# Patient Record
Sex: FEMALE | Race: BLACK OR AFRICAN AMERICAN | NOT HISPANIC OR LATINO | URBAN - METROPOLITAN AREA
[De-identification: names, ages, dates, MRNs, and addresses within clinical notes are randomized per-mention and may not be internally consistent; named-entity substitution may affect disease eponyms.]

---

## 2023-01-19 ENCOUNTER — OFFICE (OUTPATIENT)
Dept: URBAN - METROPOLITAN AREA CLINIC 13 | Facility: CLINIC | Age: 33
End: 2023-01-19
Payer: COMMERCIAL

## 2023-01-19 VITALS
HEIGHT: 63 IN | SYSTOLIC BLOOD PRESSURE: 118 MMHG | OXYGEN SATURATION: 98 % | HEART RATE: 65 BPM | DIASTOLIC BLOOD PRESSURE: 78 MMHG | WEIGHT: 170 LBS

## 2023-01-19 DIAGNOSIS — R14.0 ABDOMINAL DISTENSION (GASEOUS): ICD-10-CM

## 2023-01-19 DIAGNOSIS — K21.9 GASTRO-ESOPHAGEAL REFLUX DISEASE WITHOUT ESOPHAGITIS: ICD-10-CM

## 2023-01-19 DIAGNOSIS — R10.13 EPIGASTRIC PAIN: ICD-10-CM

## 2023-01-19 PROCEDURE — 99244 OFF/OP CNSLTJ NEW/EST MOD 40: CPT | Performed by: INTERNAL MEDICINE

## 2023-01-23 ENCOUNTER — OFFICE (OUTPATIENT)
Dept: URBAN - METROPOLITAN AREA PATHOLOGY 1 | Facility: PATHOLOGY | Age: 33
End: 2023-01-23
Payer: COMMERCIAL

## 2023-01-23 ENCOUNTER — AMBULATORY SURGICAL CENTER (OUTPATIENT)
Dept: URBAN - METROPOLITAN AREA SURGERY 4 | Facility: SURGERY | Age: 33
End: 2023-01-23
Payer: COMMERCIAL

## 2023-01-23 VITALS
DIASTOLIC BLOOD PRESSURE: 67 MMHG | HEART RATE: 75 BPM | HEART RATE: 75 BPM | SYSTOLIC BLOOD PRESSURE: 124 MMHG | HEART RATE: 71 BPM | TEMPERATURE: 98.4 F | SYSTOLIC BLOOD PRESSURE: 129 MMHG | WEIGHT: 170 LBS | DIASTOLIC BLOOD PRESSURE: 59 MMHG | SYSTOLIC BLOOD PRESSURE: 112 MMHG | DIASTOLIC BLOOD PRESSURE: 92 MMHG | RESPIRATION RATE: 15 BRPM | HEART RATE: 63 BPM | SYSTOLIC BLOOD PRESSURE: 110 MMHG | OXYGEN SATURATION: 98 % | HEART RATE: 69 BPM | HEART RATE: 72 BPM | OXYGEN SATURATION: 97 % | OXYGEN SATURATION: 100 % | SYSTOLIC BLOOD PRESSURE: 112 MMHG | SYSTOLIC BLOOD PRESSURE: 124 MMHG | DIASTOLIC BLOOD PRESSURE: 66 MMHG | DIASTOLIC BLOOD PRESSURE: 67 MMHG | RESPIRATION RATE: 16 BRPM | DIASTOLIC BLOOD PRESSURE: 73 MMHG | RESPIRATION RATE: 10 BRPM | TEMPERATURE: 98.4 F | RESPIRATION RATE: 16 BRPM | HEART RATE: 71 BPM | DIASTOLIC BLOOD PRESSURE: 92 MMHG | DIASTOLIC BLOOD PRESSURE: 59 MMHG | HEART RATE: 63 BPM | WEIGHT: 170 LBS | OXYGEN SATURATION: 100 % | SYSTOLIC BLOOD PRESSURE: 129 MMHG | SYSTOLIC BLOOD PRESSURE: 110 MMHG | HEART RATE: 72 BPM | SYSTOLIC BLOOD PRESSURE: 126 MMHG | OXYGEN SATURATION: 98 % | RESPIRATION RATE: 10 BRPM | HEIGHT: 63 IN | HEIGHT: 63 IN | OXYGEN SATURATION: 97 % | DIASTOLIC BLOOD PRESSURE: 66 MMHG | DIASTOLIC BLOOD PRESSURE: 73 MMHG | RESPIRATION RATE: 15 BRPM | HEART RATE: 69 BPM | SYSTOLIC BLOOD PRESSURE: 126 MMHG

## 2023-01-23 DIAGNOSIS — K31.89 OTHER DISEASES OF STOMACH AND DUODENUM: ICD-10-CM

## 2023-01-23 DIAGNOSIS — R12 HEARTBURN: ICD-10-CM

## 2023-01-23 DIAGNOSIS — R14.0 ABDOMINAL DISTENSION (GASEOUS): ICD-10-CM

## 2023-01-23 PROCEDURE — 88305 TISSUE EXAM BY PATHOLOGIST: CPT | Performed by: PATHOLOGY

## 2023-01-23 PROCEDURE — 43239 EGD BIOPSY SINGLE/MULTIPLE: CPT | Performed by: INTERNAL MEDICINE

## 2023-01-23 PROCEDURE — 88312 SPECIAL STAINS GROUP 1: CPT | Performed by: PATHOLOGY

## 2023-01-23 NOTE — SERVICENOTES
EGD does not exclude oropharyngeal/bowel wall/periampullary pathology
Continue Protonix
Avoid NSAIDs

## 2023-01-30 LAB
PDF: PDF REPORT: (no result)
PDF: PDF REPORT: (no result)